# Patient Record
Sex: MALE | Race: WHITE
[De-identification: names, ages, dates, MRNs, and addresses within clinical notes are randomized per-mention and may not be internally consistent; named-entity substitution may affect disease eponyms.]

---

## 2018-07-16 ENCOUNTER — HOSPITAL ENCOUNTER (OUTPATIENT)
Dept: HOSPITAL 39 - AMB | Age: 78
Discharge: HOME | End: 2018-07-16
Attending: OPHTHALMOLOGY
Payer: MEDICARE

## 2018-07-16 DIAGNOSIS — Z79.01: ICD-10-CM

## 2018-07-16 DIAGNOSIS — E66.9: ICD-10-CM

## 2018-07-16 DIAGNOSIS — H25.11: Primary | ICD-10-CM

## 2018-07-16 DIAGNOSIS — I10: ICD-10-CM

## 2018-07-16 DIAGNOSIS — Z79.899: ICD-10-CM

## 2018-07-16 DIAGNOSIS — E11.36: ICD-10-CM

## 2018-07-16 PROCEDURE — 00142 ANES PX ON EYE LENS SURGERY: CPT

## 2018-07-16 PROCEDURE — 82948 REAGENT STRIP/BLOOD GLUCOSE: CPT

## 2018-07-16 PROCEDURE — 36416 COLLJ CAPILLARY BLOOD SPEC: CPT

## 2018-07-16 PROCEDURE — 66984 XCAPSL CTRC RMVL W/O ECP: CPT

## 2018-07-30 ENCOUNTER — HOSPITAL ENCOUNTER (OUTPATIENT)
Dept: HOSPITAL 39 - AMB | Age: 78
Discharge: HOME | End: 2018-07-30
Attending: OPHTHALMOLOGY
Payer: MEDICARE

## 2018-07-30 DIAGNOSIS — Z79.899: ICD-10-CM

## 2018-07-30 DIAGNOSIS — H25.12: Primary | ICD-10-CM

## 2018-07-30 DIAGNOSIS — Z79.01: ICD-10-CM

## 2018-07-30 DIAGNOSIS — E11.36: ICD-10-CM

## 2018-07-30 DIAGNOSIS — I10: ICD-10-CM

## 2018-07-30 PROCEDURE — 82948 REAGENT STRIP/BLOOD GLUCOSE: CPT

## 2018-07-30 PROCEDURE — 66984 XCAPSL CTRC RMVL W/O ECP: CPT

## 2018-07-30 PROCEDURE — 36416 COLLJ CAPILLARY BLOOD SPEC: CPT

## 2021-02-04 ENCOUNTER — HOSPITAL ENCOUNTER (OUTPATIENT)
Dept: HOSPITAL 39 - CT | Age: 81
End: 2021-02-04
Attending: INTERNAL MEDICINE
Payer: MEDICARE

## 2021-02-04 DIAGNOSIS — C69.61: Primary | ICD-10-CM

## 2021-02-04 DIAGNOSIS — M47.892: ICD-10-CM

## 2021-02-05 NOTE — CT
EXAM DESCRIPTION:

Soft Tissue Neck: Computed Tomography



CLINICAL HISTORY:

80 years Male, MALIGNANT NEOPLASM OR ORBIT



COMPARISON:

None.



TECHNIQUE: 

Spiral, axial 0.5 x 2.5 mm scans through the neck soft tissues

without IV contrast. (IV contrast not given due to patient

abnormal renal function test. Sagittal and coronal 2.0 mm

reconstructions. No adverse reactions.  Total Exam DLP:

Approximately 700 mGy-cm.  This exam was performed according to

our departmental CT dose-optimization program which includes

automated exposure control, adjustment of the mA and/or kV

according to patient size and/or use of iterative reconstruction

technique; to reduce radiation dose to as low as reasonably

achievable (ALARA).



FINDINGS: 

Technically difficult study due to lack of IV contrast. Bilateral

submandibular and parotid glands and sublingual salivary glands

are symmetric and normal density. Bilateral small lymph nodes

abutting these Ollie less than 1 cm in greatest diameter, except

for left side lymph node 14 mm on the lateral aspect of the

inferior submandibular gland. Lymph nodes in the parapharyngeal

space, carotid space, and paracervical spaces are unremarkable.



Nasopharynx, oropharynx, hypopharynx, and larynx are well

visualized with symmetric appearance and no mass is a single

impressing on the airway. No displacement of the airway. Normal

density of the thyroid gland bilaterally. No lymph nodes abutting

the thyroid or trachea and the upper mediastinum. Atherosclerotic

calcifications in several brachiocephalic vessels and the aortic

arch which demonstrates normal contour. Minimal shift of the

trachea and esophagus to the right of midline due to the position

of the distal aortic arch.



Included lung showing mild senescent changes..



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

and T1-T2. Significant unilateral or bilateral foraminal

narrowing C4-5, C5-6, and C6-7. Also facet arthrosis

predominately C3-4, C4-5, and C5-6.



IMPRESSION: 

Limited study due to lack of IV contrast. One lymph node on the

lateral aspect of the left submandibular gland measures 14 mm.

Remaining lymph nodes with normal size. No effacement or

displacement of the airway. 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

cervical spine with facet arthrosis..



Electronically signed by:  Alejo Estrella MD  2/5/2021 12:44 PM CST

Workstation: [id number]

## 2021-02-05 NOTE — CT
EXAM DESCRIPTION: 

Head: Computed Tomography.



CLINICAL HISTORY: 

MALIGNANT NEOPLASM OF ORBIT



COMPARISON:

CT scan of the neck soft tissues on the same visit. CT scan of

the orbits September 2020..



TECHNIQUE: 

Non-helical axial scans through the skull and brain, at 5 x 20 mm

intervals, non-contrast. Coronal and sagittal 2.0 mm

reconstructions.  Total Exam DLP: 334 mGy-cm.  No contrast due to

poor renal function. This exam was performed according to our

departmental dose-optimization program which includes automated

exposure control, adjustment of the mA and/or kV according to

patient size and/or use of iterative reconstruction technique; to

reduce radiation dose to as low as reasonably achievable (ALARA).



FINDINGS: 

No intra-axial hemorrhage, no mass-effect, and no midline shift. 

 Decreased density in the periventricular white matter

frontoparietal occipital lobes. Small calcification in the right

basal ganglia. Otherwise no abnormal radiodense material in the

brain parenchyma.   Vascular calcifications  anterior and

posterior circulation. Physiologic calcifications in the pineal

gland and choroid plexus. 



No effacement or displacement of the ventricles, CSF spaces, or

subdural spaces.  Prominence of the spaces in the cortical sulci

bilaterally appears physiologic and symmetric. No extra axial

fluid collection or hemorrhage.   No gross abnormalities of the

bony calvarium.   Mastoid air cells are well-aerated. Minimal

mucoperiosteal thickening in the paranasal sinuses with no

air-fluid levels. Also thickening in the base of the right

maxillary antrum.



Compared to the prior study, the soft tissue mass involving the

globe of the eye and the eyelid has enlarged, measuring 4.7 cm

transverse and 2.6 cm, from the globe posterior to the anterior

margin of the mass. The globe is rotated with the optic nerve

entrance rotating superior and the anterior globe rotating

inferiorly, with the lens almost perpendicular to the floor of

the orbit.. The mass is also exhibiting mass effect into the

sclera on the medial and lateral aspect of the superior globe. It

is moving posteriorly along the medial and lateral sclera

involving the insertions of the medial and lateral rectus. Mass

also involving the insertion of the superior rectus on the

sclera. The mass is extending inferiorly in the soft tissues

involving the lower eyelid and and can be seen anterior to the

floor the orbit/upper maxilla, approximately 6 mm below the

anterior orbital rim. No mass lesion of the cortex of the orbital

floor of the anterior superior maxilla. No invasion of the

antrum. No invasion of the superior right nasal bone. Minimal

mucoperiosteal thickening in the adjacent anterior right ethmoid

air cells but no invasion seen..



IMPRESSION: 

1. No hemorrhage, no mass effect, no midline shift. Age-related

changes in the cerebral hemispheres. The sensitivity of this exam

is less without IV contrast-than that of brain MRI.

2. Mass in the anterior right orbit invading the soft tissue

structures and with mass effect on the sclera and extraocular

muscles. Optic globe is rotated anterior- inferior. Minimally

displaced. Mass appears to be spreading along the medial and

lateral sclera. Mass invading the soft tissue anterior to the

inferior floor of the orbit.



Electronically signed by:  Alejo Estrella MD  2/5/2021 8:15 AM [facility]

Workstation: [id number]